# Patient Record
(demographics unavailable — no encounter records)

---

## 2024-11-15 NOTE — DISCUSSION/SUMMARY
[FreeTextEntry1] : She is anticipating lab testing soon advised to have visit with me to discuss options discussed today ezetimibe Nexletol and injectable agents such as Repatha depending on results and preferences.  Risk factor management discussed limitations of stress testing discussed.

## 2024-11-15 NOTE — ASSESSMENT
[FreeTextEntry1] : Hypertension.  Hyperlipidemia.  Family history of early heart disease.  Elevated coronary calcium score for age.  Asymptomatic PVCs.  Overweight, improved with Ozempic.  Statin intolerance by history

## 2024-11-15 NOTE — HISTORY OF PRESENT ILLNESS
[FreeTextEntry1] : 69-year-old woman presents for elevated calcium score.  This was obtained as part of follow-up CT scan for resection of thymoma.  She has a history of myasthenia gravis and Graves' disease hypertension hyperlipidemia.  There is a history of heart disease early involving her father.  She indicates she has no chest pain or dyspnea  Since initially evaluated had negative nuclear stress study and benign echocardiogram.  She is now on Ozempic and has lost about 15 pounds.  She reports she was on statin in the past but had significant muscle weakness which resolved with discontinuation.

## 2024-11-15 NOTE — CARDIOLOGY SUMMARY
[de-identified] : 2024 normal nuclear stress [de-identified] : September 5, 2024 sinus PVC [de-identified] : 2024 normal LV [de-identified] : 2024 coronary calcium score 90th percentile

## 2025-05-23 NOTE — ASSESSMENT
[FreeTextEntry1] : Hypertension.  Hyperlipidemia.  Family history of early heart disease.  Elevated coronary calcium score for age.  Asymptomatic PVCs.  Overweight, improved with Mounjaro statin intolerance, now on Zetia

## 2025-05-23 NOTE — REASON FOR VISIT
[CV Risk Factors and Non-Cardiac Disease] : CV risk factors and non-cardiac disease [Hypertension] : hypertension [FreeTextEntry3] : Dina

## 2025-05-23 NOTE — CARDIOLOGY SUMMARY
[de-identified] : September 5, 2024 sinus PVC May 2025 sinus rhythm [de-identified] : 2024 normal nuclear stress [de-identified] : 2024 normal LV [de-identified] : 2024 coronary calcium score 90th percentile

## 2025-05-23 NOTE — DISCUSSION/SUMMARY
[FreeTextEntry1] : Follow-up with her PMD see me in 6 months labs testing including lipid profile low-sodium diet risk factor management discussed [EKG obtained to assist in diagnosis and management of assessed problem(s)] : EKG obtained to assist in diagnosis and management of assessed problem(s)

## 2025-05-23 NOTE — HISTORY OF PRESENT ILLNESS
[FreeTextEntry1] : 70-year-old woman presents for elevated calcium score.  This was obtained as part of follow-up CT scan for resection of thymoma.  She has a history of myasthenia gravis and Graves' disease hypertension hyperlipidemia.  There is a history of heart disease early involving her father.  She indicates she has no chest pain or dyspnea  Since initially evaluated had negative nuclear stress study and benign echocardiogram.    She had GI symptoms on Ozempic changed to Mounjaro has done well with this.  She reports she was on statin in the past but had significant muscle weakness which resolved with discontinuation.  Now taking Zetia.  Med list updated.